# Patient Record
Sex: MALE | Race: OTHER | URBAN - METROPOLITAN AREA
[De-identification: names, ages, dates, MRNs, and addresses within clinical notes are randomized per-mention and may not be internally consistent; named-entity substitution may affect disease eponyms.]

---

## 2017-04-05 NOTE — PATIENT DISCUSSION
OCULAR ROSACEA, OU:  PRESCRIBE WARM COMPRESSES. If discomfort increases begin triglyceride formula fish oil orally.

## 2022-11-29 ENCOUNTER — TECH ONLY (OUTPATIENT)
Dept: URBAN - METROPOLITAN AREA CLINIC 15 | Facility: CLINIC | Age: 72
End: 2022-11-29

## 2022-11-29 ENCOUNTER — NEW PATIENT (OUTPATIENT)
Dept: URBAN - METROPOLITAN AREA CLINIC 15 | Facility: CLINIC | Age: 72
End: 2022-11-29

## 2022-11-29 DIAGNOSIS — H40.1421: ICD-10-CM

## 2022-11-29 DIAGNOSIS — H40.1413: ICD-10-CM

## 2022-11-29 PROCEDURE — 92004 COMPRE OPH EXAM NEW PT 1/>: CPT

## 2022-11-29 PROCEDURE — 92020 GONIOSCOPY: CPT

## 2022-11-29 PROCEDURE — 99211T TECH SERVICE

## 2022-11-29 PROCEDURE — 92133 CPTRZD OPH DX IMG PST SGM ON: CPT

## 2022-11-29 RX ORDER — BRIMONIDINE TARTRATE, TIMOLOL MALEATE 2; 5 MG/ML; MG/ML
1 SOLUTION/ DROPS OPHTHALMIC
Start: 2022-11-29

## 2022-11-29 RX ORDER — LATANOPROST 50 UG/ML: 1 SOLUTION OPHTHALMIC EVERY EVENING

## 2022-11-29 RX ORDER — DORZOLAMIDE HYDROCHLORIDE TIMOLOL MALEATE 20; 5 MG/ML; MG/ML: 1 SOLUTION/ DROPS OPHTHALMIC TWICE A DAY

## 2022-11-29 RX ORDER — ACETAZOLAMIDE 250 MG/1: 1 TABLET ORAL TWICE A DAY

## 2022-11-29 ASSESSMENT — TONOMETRY
OD_IOP_MMHG: 22
OS_IOP_MMHG: 38
OS_IOP_MMHG: 26
OD_IOP_MMHG: 22

## 2022-11-29 ASSESSMENT — VISUAL ACUITY
OS_SC: 20/50+2
OU_SC: 20/25
OS_SC: 20/25
OD_SC: 20/25

## 2022-12-12 ENCOUNTER — FOLLOW UP (OUTPATIENT)
Dept: URBAN - METROPOLITAN AREA CLINIC 15 | Facility: CLINIC | Age: 72
End: 2022-12-12

## 2022-12-12 PROCEDURE — 99212 OFFICE O/P EST SF 10 MIN: CPT

## 2022-12-12 ASSESSMENT — VISUAL ACUITY
OD_PH: 20/70
OS_SC: 20/25
OD_SC: 20/80+1